# Patient Record
Sex: MALE | Race: AMERICAN INDIAN OR ALASKA NATIVE
[De-identification: names, ages, dates, MRNs, and addresses within clinical notes are randomized per-mention and may not be internally consistent; named-entity substitution may affect disease eponyms.]

---

## 2018-04-15 ENCOUNTER — HOSPITAL ENCOUNTER (EMERGENCY)
Dept: HOSPITAL 5 - ED | Age: 58
Discharge: HOME | End: 2018-04-15
Payer: COMMERCIAL

## 2018-04-15 VITALS — SYSTOLIC BLOOD PRESSURE: 135 MMHG | DIASTOLIC BLOOD PRESSURE: 89 MMHG

## 2018-04-15 DIAGNOSIS — J30.89: Primary | ICD-10-CM

## 2018-04-15 DIAGNOSIS — F12.10: ICD-10-CM

## 2018-04-15 DIAGNOSIS — F17.200: ICD-10-CM

## 2018-04-15 DIAGNOSIS — I10: ICD-10-CM

## 2018-04-15 PROCEDURE — 87430 STREP A AG IA: CPT

## 2018-04-15 PROCEDURE — 99283 EMERGENCY DEPT VISIT LOW MDM: CPT

## 2018-04-15 PROCEDURE — 87116 MYCOBACTERIA CULTURE: CPT

## 2018-04-15 NOTE — EMERGENCY DEPARTMENT REPORT
Minor Respiratory





- HPI


Chief Complaint: Sore Throat


Stated Complaint: SORE THROAT


Time Seen by Provider: 04/15/18 11:43


Duration: 2 Days


Pain Location: Throat


Severity: moderate


Minor Respiratory: Yes Sore Throat, Yes Able to Tolerate Fluids, No Rhinorrhea, 

No Ear Pain, No Cough, No Sick Contacts, No Hemoptysis, No Chest Pain, No 

Shortness of Breath, No Fever


Other History: This is a 58 y.o. male that presents with sore throat for 2 

days. Patient reports going to PCP Dr. Olivier Macedo 4/10 for similar symptoms and 

placed on zyrtec and amlodipine 10 mg po daily for hypertension and allergies. 

He is taking amlodipine daily but stopped taking zyrtec because he thought he 

couldn't take both medications at the same time. Reports pain in throat is not 

really a pain but more like a burning sensation and progressed yesterday. 

Denies fever, difficulty swallowing, chest pain, drooling, or hoarsness.





ED Review of Systems


ROS: 


Stated complaint: SORE THROAT


Other details as noted in HPI





Constitutional: denies: chills, fever


ENT: throat pain.  denies: ear pain, dental pain, hearing loss, epistaxis, 

congestion


Respiratory: denies: cough, shortness of breath, wheezing


Cardiovascular: denies: chest pain, palpitations, edema, syncope


Gastrointestinal: denies: abdominal pain, nausea, vomiting, diarrhea


Neurological: denies: headache, weakness, paresthesias


Psychiatric: denies: anxiety, depression





ED Past Medical Hx





- Past Medical History


Previous Medical History?: Yes


Hx Hypertension: Yes


Additional medical history: sinusitis





- Surgical History


Past Surgical History?: Yes


Additional Surgical History: Right inquinalhernia repair 2011 2-3 yrs ago





- Social History


Smoking Status: Current Every Day Smoker


Substance Use Type: Alcohol, Marijuana





- Medications


Home Medications: 


 Home Medications











 Medication  Instructions  Recorded  Confirmed  Last Taken  Type


 


Hydrochlorothiazide [HCTZ] 25 mg PO ONCE #30 tablet 02/02/15  Unknown Rx














Minor Respiratory Exam





- Exam


General: 


Vital signs noted. No distress. Alert and acting appropriately.





HEENT: Yes Pharyngeal Erythema, Yes Moist Mucous Membranes, No Pharyngeal 

Exudates, No Rhinorrhea, No Conjuctival Injection, No Frontal Tenderness, No 

Maxillary Tenderness


Ear: Neither TM Bulge, Neither TM Erythema, Neither EAC Pain, Neither EAC 

Discharge


Neck: Yes Supple, No Adenopathy


Lungs: Yes Good Air Exchange, No Wheezes, No Ronchi, No Stridor, No Cough, No 

Labored Respirations, No Retractions, No Use of Accessory Muscles, No Other 

Abnormal Lung Sounds


Heart: Yes Regular, No Murmur


Abdomen: Yes Normal Bowel Sounds, No Tenderness, No Peritoneal Signs


Skin: No Rash, No Edema


Neurologic: 


Alert and oriented, no deficits.








Musculoskeletal: 


Unremarkable.











ED Course


 Vital Signs











  04/15/18





  10:29


 


Temperature 98.7 F


 


Pulse Rate 86


 


Respiratory 20





Rate 


 


Blood Pressure 153/100


 


O2 Sat by Pulse 98





Oximetry 














ED Medical Decision Making





- Medical Decision Making





This is a 58 y.o. male with a sore throat for 2 days. Patient examined by me 

and stable. No distress noted. Vitals stable. Obtained rapid strep and 

negative. Physical findings susceptible of allergic rhinitis. Continue taking 

zyrtec 10 mg po daily. Discussed plan with patient. He agrees with plan. 

Discharged home. Return to work tomorrow. Follow up with PCP in 24-72 hours.





Critical care attestation.: 


If time is entered above; I have spent that time in minutes in the direct care 

of this critically ill patient, excluding procedure time.








ED Disposition


Clinical Impression: 


Allergic rhinitis


Qualifiers:


 Allergic rhinitis trigger: pollen Allergic rhinitis seasonality: seasonal 

Qualified Code(s): J30.1 - Allergic rhinitis due to pollen





Disposition: DC-01 TO HOME OR SELFCARE


Is pt being admited?: No


Does the pt Need Aspirin: No


Condition: Stable


Instructions:  Allergic Rhinitis (ED)


Additional Instructions: 


Increase fluid intake and rest. 


Wash hands frequently.


Take zyrtec daily as need for symptom relief.


Symptoms should improve in the next 3-7 days. Avoid triggers such as pollen and 

smoke.


Use nasal saline spray to help control congestion and rinse nasal cavity, to 

improve breathing.


F/U with Primary Care Provider in 2-3 days.


Return to ER if fever, SOB, or difficulty breathing after 48 hours of 

supportive care.





Referrals: 


OLIVIER MACEDO MD [Primary Care Provider] - 3-5 Days


Forms:  Work/School Release Form(ED)


Time of Disposition: 13:00


Print Language: ENGLISH

## 2021-07-17 ENCOUNTER — HOSPITAL ENCOUNTER (EMERGENCY)
Dept: HOSPITAL 5 - ED | Age: 61
Discharge: HOME | End: 2021-07-17
Payer: COMMERCIAL

## 2021-07-17 VITALS — SYSTOLIC BLOOD PRESSURE: 134 MMHG | DIASTOLIC BLOOD PRESSURE: 89 MMHG

## 2021-07-17 DIAGNOSIS — I10: ICD-10-CM

## 2021-07-17 DIAGNOSIS — Z79.899: ICD-10-CM

## 2021-07-17 DIAGNOSIS — R31.9: Primary | ICD-10-CM

## 2021-07-17 DIAGNOSIS — Z98.890: ICD-10-CM

## 2021-07-17 DIAGNOSIS — F17.200: ICD-10-CM

## 2021-07-17 DIAGNOSIS — B37.9: ICD-10-CM

## 2021-07-17 DIAGNOSIS — F12.10: ICD-10-CM

## 2021-07-17 LAB
ALBUMIN SERPL-MCNC: 5 G/DL (ref 3.9–5)
ALT SERPL-CCNC: 30 UNITS/L (ref 7–56)
APTT BLD: 26.5 SEC. (ref 24.2–36.6)
BASOPHILS # (AUTO): 0.1 K/MM3 (ref 0–0.1)
BASOPHILS NFR BLD AUTO: 1 % (ref 0–1.8)
BILIRUB UR QL STRIP: (no result)
BLOOD UR QL VISUAL: (no result)
BUN SERPL-MCNC: 15 MG/DL (ref 9–20)
BUN/CREAT SERPL: 15 %
CALCIUM SERPL-MCNC: 10.3 MG/DL (ref 8.4–10.2)
EOSINOPHIL # BLD AUTO: 0.1 K/MM3 (ref 0–0.4)
EOSINOPHIL NFR BLD AUTO: 1.4 % (ref 0–4.3)
HCT VFR BLD CALC: 47.1 % (ref 35.5–45.6)
HEMOLYSIS INDEX: 14
HGB BLD-MCNC: 16.2 GM/DL (ref 11.8–15.2)
HYALINE CASTS #/AREA URNS LPF: 1 /LPF
INR PPP: 0.91 (ref 0.87–1.13)
LYMPHOCYTES # BLD AUTO: 1.8 K/MM3 (ref 1.2–5.4)
LYMPHOCYTES NFR BLD AUTO: 31.2 % (ref 13.4–35)
MCHC RBC AUTO-ENTMCNC: 34 % (ref 32–34)
MCV RBC AUTO: 92 FL (ref 84–94)
MONOCYTES # (AUTO): 0.5 K/MM3 (ref 0–0.8)
MONOCYTES % (AUTO): 9.4 % (ref 0–7.3)
PH UR STRIP: 5 [PH] (ref 5–7)
PLATELET # BLD: 194 K/MM3 (ref 140–440)
PROT UR STRIP-MCNC: (no result) MG/DL
RBC # BLD AUTO: 5.1 M/MM3 (ref 3.65–5.03)
RBC #/AREA URNS HPF: > 182 /HPF (ref 0–6)
UROBILINOGEN UR-MCNC: < 2 MG/DL (ref ?–2)
WBC #/AREA URNS HPF: 0 /HPF (ref 0–6)

## 2021-07-17 PROCEDURE — 85730 THROMBOPLASTIN TIME PARTIAL: CPT

## 2021-07-17 PROCEDURE — 85610 PROTHROMBIN TIME: CPT

## 2021-07-17 PROCEDURE — 80053 COMPREHEN METABOLIC PANEL: CPT

## 2021-07-17 PROCEDURE — 85025 COMPLETE CBC W/AUTO DIFF WBC: CPT

## 2021-07-17 PROCEDURE — 81001 URINALYSIS AUTO W/SCOPE: CPT

## 2021-07-17 PROCEDURE — 36415 COLL VENOUS BLD VENIPUNCTURE: CPT

## 2021-07-17 NOTE — EMERGENCY DEPARTMENT REPORT
ED Male  HPI





- General


Chief complaint: Urogenital-Male


Stated complaint: BLOOD IN URINE


Time Seen by Provider: 07/17/21 10:18


Source: patient


Mode of arrival: Ambulatory


Limitations: No Limitations





- History of Present Illness


Initial comments: 





Patient is a 61-year-old male presents emergency room with complaints of one 

episode of hematuria that occurred last night.  He states he had the episode of 

hematuria after having sexual intercourse with his wife. he states he has 

urinated since then without any blood present. he states he had a normal BM 

today as well. He denies any trauma, rough intercourse, penile pain.  Patient 

states that he had a colonoscopy on 6/14/2021 with [facility].  He denies any 

rectal bleeding, hematochezia, melena, hematemesis, back pain, abdominal pain, 

fever, nausea, vomiting, diarrhea, dysuria, difficulty urinating, pain or 

swelling in the testicles.  Past medical history of hypertension.  No allergies 

to medications.





- Related Data


                                  Previous Rx's











 Medication  Instructions  Recorded  Last Taken  Type


 


hydroCHLOROthiazide [HCTZ] 25 mg PO ONCE #30 tablet 02/02/15 Unknown Rx


 


Fluconazole [Diflucan TAB] 150 mg PO QDAY 1 Days #3 tablet 07/17/21 Unknown Rx











                                    Allergies











Allergy/AdvReac Type Severity Reaction Status Date / Time


 


No Known Allergies Allergy   Verified 02/02/15 11:09














ED Review of Systems


ROS: 


Stated complaint: BLOOD IN URINE


Other details as noted in HPI





Comment: All other systems reviewed and negative





ED Past Medical Hx





- Past Medical History


Previous Medical History?: Yes


Hx Hypertension: Yes


Additional medical history: sinusitis





- Surgical History


Past Surgical History?: Yes


Additional Surgical History: Right inquinalhernia repair 2011 2-3 yrs ago, 

Colonoscopy





- Social History


Smoking Status: Current Every Day Smoker


Substance Use Type: Alcohol, Marijuana





- Medications


Home Medications: 


                                Home Medications











 Medication  Instructions  Recorded  Confirmed  Last Taken  Type


 


hydroCHLOROthiazide [HCTZ] 25 mg PO ONCE #30 tablet 02/02/15  Unknown Rx


 


Fluconazole [Diflucan TAB] 150 mg PO QDAY 1 Days #3 tablet 07/17/21  Unknown Rx














ED Physical Exam





- General


Limitations: No Limitations


General appearance: alert, in no apparent distress





- Head


Head exam: Present: atraumatic, normocephalic





- Eye


Eye exam: Present: normal appearance





- ENT


ENT exam: Present: mucous membranes moist





- Respiratory


Respiratory exam: Present: normal lung sounds bilaterally.  Absent: respiratory 

distress, wheezes, rales, rhonchi, stridor, chest wall tenderness, accessory 

muscle use, decreased breath sounds, prolonged expiratory





- Cardiovascular


Cardiovascular Exam: Present: regular rate, normal rhythm, normal heart sounds. 

Absent: systolic murmur, diastolic murmur, rubs, gallop





- GI/Abdominal


GI/Abdominal exam: Present: soft, normal bowel sounds.  Absent: distended, 

tenderness, guarding, rebound, rigid





- 


 exam: Present: normal inspection, other (chaperone: robby RN, there is no 

signs of penile tear, no penile edema, no ecchymosis, no ttp, no ttp of the 

testicles, no scrotal edema, normal testicular lie, normal cremasteric reflex). 

Absent: testicular tenderness, urethral discharge, scrotal swelling





- Neurological Exam


Neurological exam: Present: alert, oriented X3





- Psychiatric


Psychiatric exam: Present: normal affect, normal mood





- Skin


Skin exam: Present: warm, dry, intact





ED Course


                                   Vital Signs











  07/17/21 07/17/21 07/17/21





  08:46 09:16 12:10


 


Temperature 98.1 F  98.7 F


 


Pulse Rate 77  79


 


Respiratory 20 16 16





Rate   


 


Blood Pressure 134/85  134/89





[Right]   


 


O2 Sat by Pulse  100 99





Oximetry   














ED Medical Decision Making





- Lab Data


Result diagrams: 


                                 07/17/21 10:58





                                 07/17/21 10:58








                                   Lab Results











  07/17/21 07/17/21 07/17/21 Range/Units





  10:58 10:58 10:58 


 


WBC  5.6    (4.5-11.0)  K/mm3


 


RBC  5.10 H    (3.65-5.03)  M/mm3


 


Hgb  16.2 H    (11.8-15.2)  gm/dl


 


Hct  47.1 H    (35.5-45.6)  %


 


MCV  92    (84-94)  fl


 


MCH  32    (28-32)  pg


 


MCHC  34    (32-34)  %


 


RDW  13.7    (13.2-15.2)  %


 


Plt Count  194    (140-440)  K/mm3


 


Lymph % (Auto)  31.2    (13.4-35.0)  %


 


Mono % (Auto)  9.4 H    (0.0-7.3)  %


 


Eos % (Auto)  1.4    (0.0-4.3)  %


 


Baso % (Auto)  1.0    (0.0-1.8)  %


 


Lymph # (Auto)  1.8    (1.2-5.4)  K/mm3


 


Mono # (Auto)  0.5    (0.0-0.8)  K/mm3


 


Eos # (Auto)  0.1    (0.0-0.4)  K/mm3


 


Baso # (Auto)  0.1    (0.0-0.1)  K/mm3


 


Seg Neutrophils %  57.0    (40.0-70.0)  %


 


Seg Neutrophils #  3.2    (1.8-7.7)  K/mm3


 


PT   12.8   (12.2-14.9)  Sec.


 


INR   0.91   (0.87-1.13)  


 


APTT   26.5   (24.2-36.6)  Sec.


 


Sodium    135 L  (137-145)  mmol/L


 


Potassium    5.1 H  (3.6-5.0)  mmol/L


 


Chloride    98.5  ()  mmol/L


 


Carbon Dioxide    27  (22-30)  mmol/L


 


Anion Gap    15  mmol/L


 


BUN    15  (9-20)  mg/dL


 


Creatinine    1.0  (0.8-1.3)  mg/dL


 


Estimated GFR    > 60  ml/min


 


BUN/Creatinine Ratio    15  %


 


Glucose    86  ()  mg/dL


 


Calcium    10.3 H  (8.4-10.2)  mg/dL


 


Total Bilirubin    0.60  (0.1-1.2)  mg/dL


 


AST    26  (5-40)  units/L


 


ALT    30  (7-56)  units/L


 


Alkaline Phosphatase    55  ()  units/L


 


Total Protein    7.2  (6.3-8.2)  g/dL


 


Albumin    5.0  (3.9-5)  g/dL


 


Albumin/Globulin Ratio    2.3  %


 


Urine Color     (Yellow)  


 


Urine Turbidity     (Clear)  


 


Urine pH     (5.0-7.0)  


 


Ur Specific Gravity     (1.003-1.030)  


 


Urine Protein     (Negative)  mg/dL


 


Urine Glucose (UA)     (Negative)  mg/dL


 


Urine Ketones     (Negative)  mg/dL


 


Urine Blood     (Negative)  


 


Urine Nitrite     (Negative)  


 


Urine Bilirubin     (Negative)  


 


Urine Urobilinogen     (<2.0)  mg/dL


 


Ur Leukocyte Esterase     (Negative)  


 


Urine WBC (Auto)     (0.0-6.0)  /HPF


 


Urine RBC (Auto)     (0.0-6.0)  /HPF


 


U Epithel Cells (Auto)     (0-13.0)  /HPF


 


Hyaline Casts     /LPF


 


Urine Yeast (Budding)     /HPF














  07/17/21 Range/Units





  Unknown 


 


WBC   (4.5-11.0)  K/mm3


 


RBC   (3.65-5.03)  M/mm3


 


Hgb   (11.8-15.2)  gm/dl


 


Hct   (35.5-45.6)  %


 


MCV   (84-94)  fl


 


MCH   (28-32)  pg


 


MCHC   (32-34)  %


 


RDW   (13.2-15.2)  %


 


Plt Count   (140-440)  K/mm3


 


Lymph % (Auto)   (13.4-35.0)  %


 


Mono % (Auto)   (0.0-7.3)  %


 


Eos % (Auto)   (0.0-4.3)  %


 


Baso % (Auto)   (0.0-1.8)  %


 


Lymph # (Auto)   (1.2-5.4)  K/mm3


 


Mono # (Auto)   (0.0-0.8)  K/mm3


 


Eos # (Auto)   (0.0-0.4)  K/mm3


 


Baso # (Auto)   (0.0-0.1)  K/mm3


 


Seg Neutrophils %   (40.0-70.0)  %


 


Seg Neutrophils #   (1.8-7.7)  K/mm3


 


PT   (12.2-14.9)  Sec.


 


INR   (0.87-1.13)  


 


APTT   (24.2-36.6)  Sec.


 


Sodium   (137-145)  mmol/L


 


Potassium   (3.6-5.0)  mmol/L


 


Chloride   ()  mmol/L


 


Carbon Dioxide   (22-30)  mmol/L


 


Anion Gap   mmol/L


 


BUN   (9-20)  mg/dL


 


Creatinine   (0.8-1.3)  mg/dL


 


Estimated GFR   ml/min


 


BUN/Creatinine Ratio   %


 


Glucose   ()  mg/dL


 


Calcium   (8.4-10.2)  mg/dL


 


Total Bilirubin   (0.1-1.2)  mg/dL


 


AST   (5-40)  units/L


 


ALT   (7-56)  units/L


 


Alkaline Phosphatase   ()  units/L


 


Total Protein   (6.3-8.2)  g/dL


 


Albumin   (3.9-5)  g/dL


 


Albumin/Globulin Ratio   %


 


Urine Color  Yellow  (Yellow)  


 


Urine Turbidity  Clear  (Clear)  


 


Urine pH  5.0  (5.0-7.0)  


 


Ur Specific Gravity  1.017  (1.003-1.030)  


 


Urine Protein  <15 mg/dl  (Negative)  mg/dL


 


Urine Glucose (UA)  Neg  (Negative)  mg/dL


 


Urine Ketones  Neg  (Negative)  mg/dL


 


Urine Blood  Lg  (Negative)  


 


Urine Nitrite  Neg  (Negative)  


 


Urine Bilirubin  Neg  (Negative)  


 


Urine Urobilinogen  < 2.0  (<2.0)  mg/dL


 


Ur Leukocyte Esterase  Neg  (Negative)  


 


Urine WBC (Auto)  0.0  (0.0-6.0)  /HPF


 


Urine RBC (Auto)  > 182.0  (0.0-6.0)  /HPF


 


U Epithel Cells (Auto)  < 1.0  (0-13.0)  /HPF


 


Hyaline Casts  1  /LPF


 


Urine Yeast (Budding)  Few  /HPF














- Medical Decision Making





Patient is a 61-year-old male presents emergency room with complaints of one 

episode of hematuria that occurred last night.  He states he had the episode of 

hematuria after having sexual intercourse with his wife. he states he has 

urinated since then without any blood present. he states he had a normal BM 

today as well. He denies any trauma, rough intercourse, penile pain.  Patient 

states that he had a colonoscopy on 6/14/2021 with [facility].  He denies any 

rectal bleeding, hematochezia, melena, hematemesis, back pain, abdominal pain, 

fever, nausea, vomiting, diarrhea, dysuria, difficulty urinating, pain or 

swelling in the testicles.  Past medical history of hypertension.  No allergies 

to medications.  Vitals are normal.  No abnormality on physical examination as 

documented in chart.  Labs are stable.  UA shows evidence of red blood cells and

 yeast.  Patient is having painless hematuria.  Discussed case with Dr. Rivera, ER

 attending who advised outpatient urology follow-up and to treat the yeast. kacie carlos prescription for fluconazole.  Discussed all results with patient answer 

questions.  Discussed the importance of urology follow-up.  Advised patient 

Please take medication as prescribed.  Please increase your water intake.  

Follow-up with your primary care doctor.  Follow-up with urologist.  Return to 

emergency room for new or worsening symptoms.


Critical care attestation.: 


If time is entered above; I have spent that time in minutes in the direct care 

of this critically ill patient, excluding procedure time.








ED Disposition


Clinical Impression: 


 Yeast detected





Hematuria


Qualifiers:


 Hematuria type: unspecified type Qualified Code(s): R31.9 - Hematuria, 

unspecified





Disposition: DC-01 TO HOME OR SELFCARE


Is pt being admited?: No


Does the pt Need Aspirin: No


Condition: Stable


Instructions:  Genital Yeast Infection, Male, Hematuria, Adult


Additional Instructions: 


Please take medication as prescribed.  Please increase your water intake.  

Follow-up with your primary care doctor.  Follow-up with urologist.  Return to 

emergency room for new or worsening symptoms.


Prescriptions: 


Fluconazole [Diflucan TAB] 150 mg PO QDAY 1 Days #3 tablet


Referrals: 


RITESH ANN MD [Primary Care Provider] - 2-3 Days


PRATEEK BEAR MD [Staff Physician] - 2-3 Days


Forms:  Work/School Release Form(ED)


Time of Disposition: 11:45


Print Language: ENGLISH

## 2021-09-27 ENCOUNTER — HOSPITAL ENCOUNTER (EMERGENCY)
Dept: HOSPITAL 5 - ED | Age: 61
Discharge: HOME | End: 2021-09-27
Payer: COMMERCIAL

## 2021-09-27 VITALS — SYSTOLIC BLOOD PRESSURE: 166 MMHG | DIASTOLIC BLOOD PRESSURE: 88 MMHG

## 2021-09-27 DIAGNOSIS — I10: ICD-10-CM

## 2021-09-27 DIAGNOSIS — F17.200: ICD-10-CM

## 2021-09-27 DIAGNOSIS — R07.89: Primary | ICD-10-CM

## 2021-09-27 LAB
ALBUMIN SERPL-MCNC: 4.8 G/DL (ref 3.9–5)
ALT SERPL-CCNC: 35 UNITS/L (ref 7–56)
APTT BLD: 25.6 SEC. (ref 24.2–36.6)
BASOPHILS # (AUTO): 0 K/MM3 (ref 0–0.1)
BASOPHILS NFR BLD AUTO: 0.7 % (ref 0–1.8)
BILIRUB DIRECT SERPL-MCNC: < 0.2 MG/DL (ref 0–0.2)
BUN SERPL-MCNC: 12 MG/DL (ref 9–20)
BUN/CREAT SERPL: 13 %
CALCIUM SERPL-MCNC: 10 MG/DL (ref 8.4–10.2)
EOSINOPHIL # BLD AUTO: 0.1 K/MM3 (ref 0–0.4)
EOSINOPHIL NFR BLD AUTO: 1.2 % (ref 0–4.3)
HCT VFR BLD CALC: 45.5 % (ref 35.5–45.6)
HEMOLYSIS INDEX: 9
HGB BLD-MCNC: 15.6 GM/DL (ref 11.8–15.2)
INR PPP: 0.83 (ref 0.87–1.13)
LYMPHOCYTES # BLD AUTO: 2 K/MM3 (ref 1.2–5.4)
LYMPHOCYTES NFR BLD AUTO: 31 % (ref 13.4–35)
MCHC RBC AUTO-ENTMCNC: 34 % (ref 32–34)
MCV RBC AUTO: 92 FL (ref 84–94)
MONOCYTES # (AUTO): 0.5 K/MM3 (ref 0–0.8)
MONOCYTES % (AUTO): 7.4 % (ref 0–7.3)
PLATELET # BLD: 187 K/MM3 (ref 140–440)
RBC # BLD AUTO: 4.94 M/MM3 (ref 3.65–5.03)

## 2021-09-27 PROCEDURE — 71045 X-RAY EXAM CHEST 1 VIEW: CPT

## 2021-09-27 PROCEDURE — 93005 ELECTROCARDIOGRAM TRACING: CPT

## 2021-09-27 PROCEDURE — 85610 PROTHROMBIN TIME: CPT

## 2021-09-27 PROCEDURE — 84484 ASSAY OF TROPONIN QUANT: CPT

## 2021-09-27 PROCEDURE — 83690 ASSAY OF LIPASE: CPT

## 2021-09-27 PROCEDURE — 85025 COMPLETE CBC W/AUTO DIFF WBC: CPT

## 2021-09-27 PROCEDURE — 36415 COLL VENOUS BLD VENIPUNCTURE: CPT

## 2021-09-27 PROCEDURE — 80076 HEPATIC FUNCTION PANEL: CPT

## 2021-09-27 PROCEDURE — 83880 ASSAY OF NATRIURETIC PEPTIDE: CPT

## 2021-09-27 PROCEDURE — 99283 EMERGENCY DEPT VISIT LOW MDM: CPT

## 2021-09-27 PROCEDURE — 80048 BASIC METABOLIC PNL TOTAL CA: CPT

## 2021-09-27 PROCEDURE — 85730 THROMBOPLASTIN TIME PARTIAL: CPT

## 2021-09-27 NOTE — XRAY REPORT
CHEST 1 VIEW 



INDICATION:  Chest Pain.



COMPARISON:  None



FINDINGS:

Support devices: None. 



Heart: Within normal limits. 

Lungs/Pleura: No acute air space or interstitial disease. 



Additional findings: None.



IMPRESSION:

 No acute findings.



Signer Name: Edwin Salazar Jr, MD 

Signed: 9/27/2021 9:43 AM

Workstation Name: [id number]

## 2021-09-27 NOTE — EMERGENCY DEPARTMENT REPORT
ED Chest Pain HPI





- General


Chief Complaint: Chest Pain


Stated Complaint: CHEST PAIN


Time Seen by Provider: 09/27/21 08:51


Source: patient


Mode of arrival: Ambulatory


Limitations: No Limitations





- History of Present Illness


Initial Comments: 





Mr. Guaman is a 61 years old male with history of hypertension, diabetes and 

hyperlipidemia.  Patient presented to the ER complaining of left sided chest 

pain that radiated to the back.  Patient stated that pain is been going on for 2

to 3 days.  Patient stated that pain got worse last night.  Patient describes 

his pain as pressure tightness and sharp sometimes.  Patient is also complaining

of some shortness of breath but he denied any cough, fever or chills.  Patient 

stated that he had a stress test done many years ago and he was told that it was

normal.


MD Complaint: chest pain


-: days(s) (2)


Onset: during rest


Pain Location: left chest


Pain Radiation: back


Severity scale (0 -10): 5


Quality: sharp





- Related Data


                                  Previous Rx's











 Medication  Instructions  Recorded  Last Taken  Type


 


hydroCHLOROthiazide [HCTZ] 25 mg PO ONCE #30 tablet 02/02/15 Unknown Rx


 


Fluconazole [Diflucan TAB] 150 mg PO QDAY 1 Days #3 tablet 07/17/21 Unknown Rx











                                    Allergies











Allergy/AdvReac Type Severity Reaction Status Date / Time


 


No Known Allergies Allergy   Verified 09/27/21 08:52














Heart Score





- HEART Score


History: Moderately suspicious


EKG: Non-specific


Age: 45-65


Risk factors: > 3 risk factors or hx of atherosclerotic disease


Troponin: < normal limit


HEART Score: 5





- EKG Read Time


Time EKG Completed: 08:57


EKG Read Time: 08:57





- Critical Actions


Critical Actions: 4-6 pts:12-16.6% risk of adverse cardiac event. Should be 

admitted





ED Review of Systems


ROS: 


Stated complaint: CHEST PAIN


Other details as noted in HPI





Comment: All other systems reviewed and negative


Constitutional: denies: chills, fever


Respiratory: shortness of breath.  denies: cough, SOB with exertion, SOB at rest


Cardiovascular: chest pain.  denies: palpitations, dyspnea on exertion


Gastrointestinal: denies: abdominal pain, nausea, vomiting, diarrhea, 

constipation, hematemesis, melena, hematochezia


Musculoskeletal: denies: back pain


Neurological: denies: headache, weakness, numbness, paresthesias, confusion, 

abnormal gait





ED Past Medical Hx





- Past Medical History


Hx Hypertension: Yes


Additional medical history: sinusitis





- Surgical History


Additional Surgical History: Right inquinalhernia repair 2011 2-3 yrs ago, 

Colonoscopy





- Social History


Smoking Status: Current Every Day Smoker


Substance Use Type: Alcohol, Marijuana





- Medications


Home Medications: 


                                Home Medications











 Medication  Instructions  Recorded  Confirmed  Last Taken  Type


 


hydroCHLOROthiazide [HCTZ] 25 mg PO ONCE #30 tablet 02/02/15  Unknown Rx


 


Fluconazole [Diflucan TAB] 150 mg PO QDAY 1 Days #3 tablet 07/17/21  Unknown Rx














ED Physical Exam





- General


Limitations: No Limitations


General appearance: alert, in no apparent distress





- Head


Head exam: Present: atraumatic, normocephalic, normal inspection





- Eye


Eye exam: Present: normal appearance, PERRL





- ENT


ENT exam: Present: normal exam, normal orophraynx, mucous membranes moist





- Neck


Neck exam: Present: normal inspection, full ROM.  Absent: tenderness, meningi

smus





- Respiratory


Respiratory exam: Present: normal lung sounds bilaterally





- Cardiovascular


Cardiovascular Exam: Present: regular rate, normal rhythm, normal heart sounds





- GI/Abdominal


GI/Abdominal exam: Present: soft, normal bowel sounds.  Absent: distended, 

tenderness, guarding, rebound, rigid, organomegaly, mass, bruit, pulsatile mass,

hernia





- Extremities Exam


Extremities exam: Present: normal inspection, full ROM, normal capillary refill.

 Absent: tenderness





- Back Exam


Back exam: Present: normal inspection, full ROM.  Absent: CVA tenderness (R), 

CVA tenderness (L)





- Neurological Exam


Neurological exam: Present: alert, oriented X3, CN II-XII intact, normal gait, 

reflexes normal





- Psychiatric


Psychiatric exam: Present: normal mood





- Skin


Skin exam: Present: warm, intact, normal color





ED Course


                                   Vital Signs











  09/27/21





  08:49


 


Temperature 98.4 F


 


Pulse Rate 69


 


Respiratory 16





Rate 


 


Blood Pressure 168/109





[Left] 


 


O2 Sat by Pulse 98





Oximetry 














- Reevaluation(s)


Reevaluation #1: 





09/27/21 11:27


Patient received 0.4 mg of nitroglycerin sublingual.  Patient stated that he fe

lt much better and his chest pain is completely resolved.  His blood pressure is

 in the normal limit now.





NEEMA score





- Neema Score


Age > 65: (0) No


Aspirin use within the Past 7 Days: (0) No


3 or more CAD Risk Factors: (1) Yes


2 or more Angina events in past 24 hrs: (0) No


Known CAD with more than 50% Stenosis: (0) No


Elevated Cardiac Markers: (0) No


ST Deviation Greater than 0.5mm: (0) No


NEEMA Score: 1





ED Medical Decision Making





- Lab Data


Result diagrams: 


                                 09/27/21 09:28





                                 09/27/21 09:28





- EKG Data


-: EKG Interpreted by Me


EKG shows normal: sinus rhythm


Rate: normal





- EKG Data


Interpretation: no acute changes





- Radiology Data


Radiology results: report reviewed





- Medical Decision Making





Mr. Guaman is a 61 years old male with history of hypertension, diabetes and 

hyperlipidemia.  Patient presented to the ER complaining of left sided chest 

pain that radiated to the back.  Patient stated that pain is been going on for 2

 to 3 days.  Patient stated that pain got worse last night.  Patient describes 

his pain as pressure tightness and sharp sometimes.  Patient is also complaining

 of some shortness of breath but he denied any cough, fever or chills.  Patient 

stated that he had a stress test done many years ago and he was told that it was

 normal.





EKG is unremarkable.  Blood pressure improved significantly with nitroglycerin. 

 Patient stated that his chest pain is completely resolved.  Labs reviewed and 

is unremarkable including negative troponin x2.  Chest x-ray is negative for ac

torey finding.  I added Norvasc 5 mg to his current blood pressure regimen and I 

strongly advised the patient to follow-up with his primary care physician in the

 next 2 to 3 days and also to follow-up with [facility] for outpatient 

cardiac work-up.  Patient advised to return to the ER if he develop any new 

symptoms or if his symptoms get worse.


Critical care attestation.: 


If time is entered above; I have spent that time in minutes in the direct care 

of this critically ill patient, excluding procedure time.








ED Disposition


Clinical Impression: 


 Acute chest pain, Malignant hypertension





Disposition: 01 HOME / SELF CARE / HOMELESS


Is pt being admited?: No


Condition: Stable


Instructions:  Chest Pain (ED), Hypertension (ED), Nonspecific Chest Pain, 

Adult, Hypertension, Adult


Referrals: 


PRIMARY CARE,MD [Primary Care Provider] - 3-5 Days

## 2021-10-04 NOTE — ELECTROCARDIOGRAPH REPORT
[facility]

                                       

Test Date:    2021               Test Time:    08:57:54

Pat Name:     SADI JIMENEZ           Department:   

Patient ID:   [id number]          Room:          

Gender:       M                        Technician:   NEYMAR

:          1960               Requested By: DUC CAAL

Order Number: [id number]              Reading MD:   Chidi Johnston

                                 Measurements

Intervals                              Axis          

Rate:         68                       P:            65

MD:           163                      QRS:          37

QRSD:         83                       T:            55

QT:           382                                    

QTc:          406                                    

                           Interpretive Statements

Sinus rhythm

No previous ECG available for comparison

Electronically Signed On 10-4-2021 13:56:30 EDT by Chidi Johnston